# Patient Record
Sex: FEMALE | Race: WHITE | Employment: OTHER | URBAN - METROPOLITAN AREA
[De-identification: names, ages, dates, MRNs, and addresses within clinical notes are randomized per-mention and may not be internally consistent; named-entity substitution may affect disease eponyms.]

---

## 2019-04-11 ENCOUNTER — NEW PATIENT (OUTPATIENT)
Dept: URBAN - METROPOLITAN AREA CLINIC 11 | Facility: CLINIC | Age: 64
End: 2019-04-11

## 2019-04-11 VITALS — DIASTOLIC BLOOD PRESSURE: 82 MMHG | HEIGHT: 55 IN | SYSTOLIC BLOOD PRESSURE: 204 MMHG

## 2019-04-11 ASSESSMENT — TONOMETRY
OS_IOP_MMHG: 21
OD_IOP_MMHG: 23

## 2019-04-11 ASSESSMENT — VISUAL ACUITY
OD_CC: CF 1FT
OS_CC: 20/400

## 2019-04-11 NOTE — PROCEDURE NOTE: CLINICAL
PROCEDURE NOTE: Avastin () #1 OD. Diagnosis: Neovascular AMD with Active CNV. Intravitreal injection of Avastin 1.25mg/0.05 ml was given. Injection site: 3-4 mm from the limbus. Patient tolerated procedure well. CF vision checked. There were no complications. Post-op instructions given. Lot #: [id number]. Expiration Date: . EXP. Inventory Id: eleuterio Kc

## 2019-05-16 ENCOUNTER — FOLLOW UP (OUTPATIENT)
Dept: URBAN - METROPOLITAN AREA CLINIC 11 | Facility: CLINIC | Age: 64
End: 2019-05-16

## 2019-05-16 ASSESSMENT — VISUAL ACUITY
OD_CC: CF 1FT
OS_CC: 20/400

## 2019-05-16 ASSESSMENT — TONOMETRY: OD_IOP_MMHG: 14

## 2019-07-30 ENCOUNTER — FOLLOW UP (OUTPATIENT)
Dept: URBAN - METROPOLITAN AREA CLINIC 11 | Facility: CLINIC | Age: 64
End: 2019-07-30

## 2019-07-30 ASSESSMENT — VISUAL ACUITY
OS_CC: 20/400
OD_CC: CF 1FT

## 2019-07-30 NOTE — PATIENT DISCUSSION
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 Suly asked patient to see a  general Medical Doctor at the [facility] clinic as soon as possible for treatment with antiviral. Ankur Mcdonald started patient on Acyclovir 200mg, one, TID PO until she can get to that appointment.

## 2021-03-02 ENCOUNTER — FOLLOW UP (OUTPATIENT)
Dept: URBAN - METROPOLITAN AREA CLINIC 11 | Facility: CLINIC | Age: 66
End: 2021-03-02

## 2021-03-02 ASSESSMENT — VISUAL ACUITY: OS_CC: 20/400

## 2021-03-02 ASSESSMENT — TONOMETRY
OD_IOP_MMHG: 16
OS_IOP_MMHG: 15

## 2021-03-02 NOTE — PATIENT DISCUSSION
Over 45 minutes was spent in dialogue with patient.  I reassured her that I see no parasitic infection in either eye.  I did recommend she return to see Dr. Margarito Chavez for Cataract evaluation and surgery. She is cleared for cataract surgery in her left eye. Please call patient at [phone] to schedule for cataract eval and surgery.

## 2021-10-26 ENCOUNTER — CONSULTATION (OUTPATIENT)
Dept: URBAN - METROPOLITAN AREA CLINIC 11 | Facility: CLINIC | Age: 66
End: 2021-10-26

## 2021-10-26 DIAGNOSIS — E11.3292: ICD-10-CM

## 2021-10-26 DIAGNOSIS — H34.8111: ICD-10-CM

## 2021-10-26 DIAGNOSIS — H35.3232: ICD-10-CM

## 2021-10-26 PROCEDURE — 99214 OFFICE O/P EST MOD 30 MIN: CPT

## 2021-10-26 ASSESSMENT — TONOMETRY
OD_IOP_MMHG: 17
OS_IOP_MMHG: 17

## 2021-10-26 ASSESSMENT — VISUAL ACUITY: OS_CC: 20/400

## 2021-10-26 NOTE — PATIENT DISCUSSION
Over 45 minutes was spent in dialogue with patient.  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   We discussed condition and the fact that I still recommend cataract surgery in her left eye.  She understands that this procedure will not give her an increase in her visual acuity, but increase the amount of light for her there.  Su Richter is cleared for cataract surgery in her left eye.

## 2021-10-26 NOTE — PATIENT DISCUSSION
I explained to patient that I feel this is an old obstruction in the right eye. I see no evidence of any kind of viral or parasitic infection there.

## 2022-07-05 RX ORDER — FLUOXETINE HYDROCHLORIDE 20 MG/1
1 CAPSULE ORAL
COMMUNITY

## 2022-07-05 RX ORDER — AMLODIPINE BESYLATE 10 MG/1
TABLET ORAL
COMMUNITY
End: 2022-09-26 | Stop reason: SDUPTHER

## 2022-07-05 RX ORDER — GABAPENTIN 300 MG/1
1 CAPSULE ORAL
COMMUNITY
End: 2022-09-26 | Stop reason: SDUPTHER

## 2022-07-05 RX ORDER — HYDROCHLOROTHIAZIDE 25 MG/1
TABLET ORAL
COMMUNITY
End: 2022-09-26 | Stop reason: SDUPTHER

## 2022-07-05 RX ORDER — ATORVASTATIN CALCIUM 40 MG/1
TABLET, FILM COATED ORAL
COMMUNITY
Start: 2022-04-01

## 2022-07-05 RX ORDER — METOPROLOL TARTRATE 50 MG/1
TABLET, FILM COATED ORAL
COMMUNITY

## 2022-07-05 RX ORDER — LISINOPRIL 20 MG/1
TABLET ORAL
COMMUNITY

## 2022-07-05 RX ORDER — INSULIN ASPART 100 [IU]/ML
INJECTION, SOLUTION INTRAVENOUS; SUBCUTANEOUS
COMMUNITY

## 2022-07-05 RX ORDER — VIT A/VIT C/VIT E/ZINC/COPPER 4296-226
CAPSULE ORAL
COMMUNITY

## 2022-07-05 RX ORDER — DICYCLOMINE HCL 20 MG
TABLET ORAL
COMMUNITY

## 2022-07-05 RX ORDER — FLUOXETINE HYDROCHLORIDE 40 MG/1
1 CAPSULE ORAL
COMMUNITY

## 2022-07-05 RX ORDER — OMEPRAZOLE 20 MG/1
CAPSULE, DELAYED RELEASE ORAL
COMMUNITY
End: 2022-09-26 | Stop reason: SDUPTHER

## 2022-08-09 ENCOUNTER — ESTABLISHED PATIENT (OUTPATIENT)
Dept: URBAN - METROPOLITAN AREA CLINIC 8 | Facility: CLINIC | Age: 67
End: 2022-08-09

## 2022-08-09 DIAGNOSIS — Z79.4: ICD-10-CM

## 2022-08-09 DIAGNOSIS — H25.13: ICD-10-CM

## 2022-08-09 DIAGNOSIS — E11.9: ICD-10-CM

## 2022-08-09 PROCEDURE — 92014 COMPRE OPH EXAM EST PT 1/>: CPT

## 2022-08-09 ASSESSMENT — TONOMETRY
OS_IOP_MMHG: 12
OD_IOP_MMHG: 15

## 2022-08-09 NOTE — PATIENT DISCUSSION
S/P CRVO OD with extensive subretinal scarring , iris post synechiae od, retinal necrosis OD and subfoveal wet amd ou.

## 2022-08-09 NOTE — PATIENT DISCUSSION
Patient told of the extreme limitations of cataract surgery in light of her extensive macular scarring.

## 2022-09-23 PROBLEM — H35.30 MACULAR DEGENERATION OF BOTH EYES: Status: ACTIVE | Noted: 2022-09-23

## 2022-09-23 PROBLEM — I73.9 VASCULAR CLAUDICATION (HCC): Status: ACTIVE | Noted: 2022-09-23

## 2022-09-23 PROBLEM — E78.2 MIXED HYPERLIPIDEMIA: Status: ACTIVE | Noted: 2022-09-23

## 2022-09-23 PROBLEM — M79.7 FIBROMYALGIA: Status: ACTIVE | Noted: 2022-09-23

## 2022-09-23 PROBLEM — E11.39 TYPE 2 DIABETES MELLITUS WITH OTHER DIABETIC OPHTHALMIC COMPLICATION (HCC): Status: ACTIVE | Noted: 2022-09-23

## 2022-09-23 PROBLEM — F33.9 EPISODE OF RECURRENT MAJOR DEPRESSIVE DISORDER (HCC): Status: ACTIVE | Noted: 2022-09-23

## 2022-09-23 PROBLEM — I10 PRIMARY HYPERTENSION: Status: ACTIVE | Noted: 2022-09-23

## 2022-09-26 PROBLEM — K21.9 GERD (GASTROESOPHAGEAL REFLUX DISEASE): Status: ACTIVE | Noted: 2022-09-26
